# Patient Record
Sex: FEMALE | Race: BLACK OR AFRICAN AMERICAN | NOT HISPANIC OR LATINO | Employment: UNEMPLOYED | URBAN - METROPOLITAN AREA
[De-identification: names, ages, dates, MRNs, and addresses within clinical notes are randomized per-mention and may not be internally consistent; named-entity substitution may affect disease eponyms.]

---

## 2018-10-12 PROCEDURE — 88175 CYTOPATH C/V AUTO FLUID REDO: CPT | Performed by: OBSTETRICS & GYNECOLOGY

## 2019-09-13 PROCEDURE — 87086 URINE CULTURE/COLONY COUNT: CPT | Performed by: INTERNAL MEDICINE

## 2019-09-26 ENCOUNTER — HISTORICAL (OUTPATIENT)
Dept: RADIOLOGY | Facility: HOSPITAL | Age: 49
End: 2019-09-26

## 2019-10-03 ENCOUNTER — HISTORICAL (OUTPATIENT)
Dept: RADIOLOGY | Facility: HOSPITAL | Age: 49
End: 2019-10-03

## 2020-02-26 ENCOUNTER — TELEPHONE (OUTPATIENT)
Dept: ORTHOPEDICS | Facility: CLINIC | Age: 50
End: 2020-02-26

## 2020-02-26 NOTE — TELEPHONE ENCOUNTER
----- Message from Kalyani Bailey sent at 2/26/2020  2:19 PM CST -----  Contact: pt @ [phone]  Calling to speak with someone regarding her appt today at 4pm, asking if she will still be seen. Please call.

## 2020-02-26 NOTE — TELEPHONE ENCOUNTER
----- Message from Haven Eldridge sent at 2/26/2020  2:27 PM CST -----  Contact: patient  Please call above patient at [phone] rescheduled appointment because transportation was late please set her up with another service do not use Another road trip service not good per patient waiting on a call from the nurse thanks.

## 2020-03-05 ENCOUNTER — OFFICE VISIT (OUTPATIENT)
Dept: ORTHOPEDICS | Facility: CLINIC | Age: 50
End: 2020-03-05
Payer: MEDICAID

## 2020-03-05 VITALS
BODY MASS INDEX: 40.45 KG/M2 | HEART RATE: 95 BPM | HEIGHT: 62 IN | SYSTOLIC BLOOD PRESSURE: 102 MMHG | OXYGEN SATURATION: 99 % | WEIGHT: 219.81 LBS | RESPIRATION RATE: 18 BRPM | DIASTOLIC BLOOD PRESSURE: 70 MMHG

## 2020-03-05 DIAGNOSIS — M54.16 LUMBAR RADICULITIS: Primary | ICD-10-CM

## 2020-03-05 PROCEDURE — 99999 PR PBB SHADOW E&M-EST. PATIENT-LVL III: CPT | Mod: PBBFAC,,, | Performed by: ORTHOPAEDIC SURGERY

## 2020-03-05 PROCEDURE — 99203 OFFICE O/P NEW LOW 30 MIN: CPT | Mod: S$PBB,,, | Performed by: ORTHOPAEDIC SURGERY

## 2020-03-05 PROCEDURE — 99203 PR OFFICE/OUTPT VISIT, NEW, LEVL III, 30-44 MIN: ICD-10-PCS | Mod: S$PBB,,, | Performed by: ORTHOPAEDIC SURGERY

## 2020-03-05 PROCEDURE — 99999 PR PBB SHADOW E&M-EST. PATIENT-LVL III: ICD-10-PCS | Mod: PBBFAC,,, | Performed by: ORTHOPAEDIC SURGERY

## 2020-03-05 PROCEDURE — 99213 OFFICE O/P EST LOW 20 MIN: CPT | Mod: PBBFAC,PN | Performed by: ORTHOPAEDIC SURGERY

## 2020-03-05 RX ORDER — HYDROXYZINE HYDROCHLORIDE 25 MG/1
25 TABLET, FILM COATED ORAL NIGHTLY
COMMUNITY

## 2020-03-05 RX ORDER — VENLAFAXINE HYDROCHLORIDE 150 MG/1
150 CAPSULE, EXTENDED RELEASE ORAL DAILY
COMMUNITY

## 2020-03-05 RX ORDER — ERGOCALCIFEROL 1.25 MG/1
50000 CAPSULE ORAL
COMMUNITY

## 2020-03-05 RX ORDER — IBUPROFEN 800 MG/1
800 TABLET ORAL 2 TIMES DAILY
COMMUNITY
End: 2020-03-06 | Stop reason: ALTCHOICE

## 2020-03-05 RX ORDER — AMLODIPINE BESYLATE 2.5 MG/1
2.5 TABLET ORAL DAILY
COMMUNITY

## 2020-03-05 NOTE — PROGRESS NOTES
Subjective:    Patient ID:  Mercedes Ledezma is a 49 y.o. y.o. female who presents for initial visit for Numbness and Pain of the Right Lower Leg      49-year-old female who reports a 2 month history of right-sided lower back pain that radiates into her right leg down to her foot.  She denies a history of trauma.  She describes a sensation of numbness involving her entire right leg and, in fact, her entire right side and occasionally her whole body.  Symptoms are constant and worse at night.  She notes difficulty walking and states that she has been using a walker since the symptoms began.  She denies groin pain, bowel or bladder dysfunction and constitutional symptoms. She relates that she has arthritis of both knees with limited right knee flexion. She takes ibuprofen on a p.r.n. basis.  She has traveled from [place] as she has been unable to find an orthopedic provider in her home area that will accept her insurance.            Past Medical History:   Diagnosis Date    Bipolar 1 disorder     Cocaine abuse     Depression     HIV (human immunodeficiency virus infection)     Hyperlipemia     Hypertension     Hypertension     Syphilis     Vitamin D deficiency         Past Surgical History:   Procedure Laterality Date     SECTION      HYSTERECTOMY         Review of patient's allergies indicates:  No Known Allergies       Current Outpatient Medications:     amLODIPine (NORVASC) 2.5 MG tablet, Take 2.5 mg by mouth once daily., Disp: , Rfl:     atorvastatin (LIPITOR) 40 MG tablet, Take 40 mg by mouth once daily., Disp: , Rfl:     extctlshd-cpjeoicd-vctamgs ala (BIKTARVY) -25 mg per tablet, Take 1 tablet by mouth once daily., Disp: , Rfl:     ergocalciferol (VITAMIN D2) 50,000 unit Cap, Take 50,000 Units by mouth every 7 days., Disp: , Rfl:     hydrOXYzine HCl (ATARAX) 25 MG tablet, Take 25 mg by mouth nightly., Disp: , Rfl:     ibuprofen (ADVIL,MOTRIN) 800 MG tablet, Take 800 mg by mouth 2 (two)  times daily., Disp: , Rfl:     venlafaxine (EFFEXOR-XR) 150 MG Cp24, Take 150 mg by mouth once daily., Disp: , Rfl:     quetiapine (SEROQUEL) 300 MG Tab, Take 200 mg by mouth once daily. , Disp: , Rfl:     Social History     Socioeconomic History    Marital status: Single     Spouse name: Not on file    Number of children: Not on file    Years of education: Not on file    Highest education level: Not on file   Occupational History    Not on file   Social Needs    Financial resource strain: Not on file    Food insecurity:     Worry: Not on file     Inability: Not on file    Transportation needs:     Medical: Not on file     Non-medical: Not on file   Tobacco Use    Smoking status: Former Smoker   Substance and Sexual Activity    Alcohol use: No    Drug use: Yes     Types: Cocaine    Sexual activity: Not on file   Lifestyle    Physical activity:     Days per week: Not on file     Minutes per session: Not on file    Stress: Not on file   Relationships    Social connections:     Talks on phone: Not on file     Gets together: Not on file     Attends [employer] service: Not on file     Active member of club or organization: Not on file     Attends meetings of clubs or organizations: Not on file     Relationship status: Not on file   Other Topics Concern    Not on file   Social History Narrative    Not on file        No family history on file.     Review of Systems   Constitutional: Negative for chills and fever.   HENT: Negative for hearing loss.    Eyes: Negative for blurred vision.   Respiratory: Negative for shortness of breath.    Cardiovascular: Negative for chest pain.   Gastrointestinal: Negative for nausea and vomiting.   Genitourinary: Negative for frequency and urgency.   Musculoskeletal: Positive for back pain.   Neurological: Positive for sensory change.   Psychiatric/Behavioral: Positive for depression.        Objective:     /70 (BP Location: Left arm, Patient Position: Sitting, BP  "Method: Large (Automatic))   Pulse 95   Resp 18   Ht 5' 2" (1.575 m)   Wt 99.7 kg (219 lb 12.8 oz)   SpO2 99%   BMI 40.20 kg/m²     Ortho Exam     Obese 49-year-old female walks with a forward flexed posture in a stiff kneed gait on the right; can heel and toe rise    Bilateral lower extremity:  Sensation intact light touch; motor strength 5/5 except 4+ out of 5 right hip flexor secondary to exacerbation of low back pain; negative straight leg raise; negative Babinski's    Lumbosacral spine:  Diffuse right-sided tenderness to light touch; decreased range of motion all planes secondary to pain; positive Eder's sign    Right hip:  No focal tenderness; full range of motion; negative Stinchfield    Right knee:  No effusion erythema or warmth; patella stable; medial joint line tenderness; no gross ligamentous laxity or meniscal signs; range of motion:  0° to 110° flexion      Imaging:     X-rays two-view right knee dated 05/12/2016 are reviewed and show mild medial joint space narrowing.      Assessment & Plan:     Lumbar radiculitis  Suspect non-organic/psychological overlay in light of amplification and non-anatomic distribution of patient's symptomatology    1.  Findings, diagnosis and treatment options/risks/benefits were reviewed  2.  Voltaren 75 mg p.o. B.i.d. (discontinue ibuprofen)  3.  Continue walker support p.r.n.  4.  Emphasized importance of weight reduction  5.  Maintain judicious activity modification/limitation  6.  Refer to [facility] at [facility]  7.  Follow up on a p.r.n. basis    "

## 2020-03-05 NOTE — ASSESSMENT & PLAN NOTE
Suspect non-organic/psychological overlay in light of amplification and non-anatomic distribution of patient's symptomatology    1.  Findings, diagnosis and treatment options/risks/benefits were reviewed  2.  Voltaren 75 mg p.o. B.i.d. (discontinue ibuprofen)  3.  Continue walker support p.r.n.  4.  Emphasized importance of weight reduction  5.  Maintain judicious activity modification/limitation  6.  Refer to [facility] at [facility]  7.  Follow up on a p.r.n. basis

## 2020-03-06 ENCOUNTER — TELEPHONE (OUTPATIENT)
Dept: ORTHOPEDICS | Facility: CLINIC | Age: 50
End: 2020-03-06

## 2020-03-06 DIAGNOSIS — M54.16 LUMBAR RADICULITIS: Primary | ICD-10-CM

## 2020-03-06 RX ORDER — DICLOFENAC SODIUM 75 MG/1
75 TABLET, DELAYED RELEASE ORAL 2 TIMES DAILY
Qty: 60 TABLET | Refills: 1 | Status: CANCELLED | OUTPATIENT
Start: 2020-03-06

## 2020-03-06 RX ORDER — DICLOFENAC SODIUM 75 MG/1
75 TABLET, DELAYED RELEASE ORAL 2 TIMES DAILY
Qty: 60 TABLET | Refills: 1 | Status: SHIPPED | OUTPATIENT
Start: 2020-03-06

## 2020-03-06 NOTE — TELEPHONE ENCOUNTER
----- Message from Cynthia Doe sent at 3/6/2020 11:44 AM CST -----  Contact: Self  Pt ask for a call in regards to having physical therapy in [place] due to no facility near her home accept her insurance Pt also ask was there going to be another prescription send to the pharmacy for her pain and numbness.     Contact info  [phone]

## 2020-03-06 NOTE — TELEPHONE ENCOUNTER
----- Message from Shaan Holguin sent at 3/6/2020 10:00 AM CST -----  Contact: Pt  Patient called to speak to someone regarding her pain medication that was supposed to be sent to her pharmacy in relation to her sciatic nerve pain requesting callback to discuss the matter     [phone] (home)

## 2020-03-06 NOTE — TELEPHONE ENCOUNTER
Left message for patient stating that only one RX was sent to pharmacy and that order for PT was pending that to call back on Monday and we can check on it.

## 2020-03-06 NOTE — TELEPHONE ENCOUNTER
----- Message from Lisa Braxton sent at 3/6/2020  9:45 AM CST -----  Contact: self  Pt states she was waiting on her prescription to be sent to [facility] since yesterday. She did not know the name of the script but pt states she is in pain and needs the medication sent over ASAP.       Contact Info [phone] (home)

## 2020-03-06 NOTE — TELEPHONE ENCOUNTER
Spoke with patient and advised her that Dr. Garcia would send her Rx sorry for the inconvenience

## 2020-03-06 NOTE — TELEPHONE ENCOUNTER
----- Message from Verona Chisholm sent at 3/6/2020  8:07 AM CST -----  Contact: pt   Pt called stated she was seen yesterday on 3/5/20 and a Rx was to be called into [facility] but [facility] never rec'd it.  She also stated she would like to have her therapy at [facility]. Call back [phone]      [id number]  [place] - [address] [facility] [address]  [address] [place] [address]  Phone: [phone] Fax: [phone]

## 2020-03-16 ENCOUNTER — TELEPHONE (OUTPATIENT)
Dept: ORTHOPEDICS | Facility: CLINIC | Age: 50
End: 2020-03-16

## 2020-03-16 NOTE — TELEPHONE ENCOUNTER
----- Message from Rachel Hardy sent at 3/16/2020  9:50 AM CDT -----  Contact: Pt  Reason: Pt is calling to speak with the Dr or Nurse regard to still in pain. Pt stated the medicine is not working.    Communication: [phone]

## 2020-03-16 NOTE — TELEPHONE ENCOUNTER
Patient stated she is still experiencing severe pain is requesting a stronger medication, patient was advised to follow up with her PCP or visit ED if pain persists

## 2020-03-17 ENCOUNTER — TELEPHONE (OUTPATIENT)
Dept: ORTHOPEDICS | Facility: CLINIC | Age: 50
End: 2020-03-17

## 2020-03-17 NOTE — TELEPHONE ENCOUNTER
----- Message from Josephine Malcolm sent at 3/17/2020  9:25 AM CDT -----  Pt is schedule for PT and would like to know where to go she need an address to the place she is going to.  referred her over and the pt would like for the nurse to give her a call back at [phone]

## 2020-03-25 ENCOUNTER — TELEPHONE (OUTPATIENT)
Dept: ORTHOPEDICS | Facility: CLINIC | Age: 50
End: 2020-03-25

## 2020-03-25 NOTE — TELEPHONE ENCOUNTER
----- Message from Sunil Brown sent at 3/25/2020  2:48 PM CDT -----  Contact: pt: [phone]  Pt state the medication she was given does not work, right side is still numb       Please contact pt: [phone]

## 2020-03-25 NOTE — TELEPHONE ENCOUNTER
Patient states she is having pain mostly at night, her right hip is worse and her feet are numb, she is requesting an appointment to come in and be seen  Patient states the diclofenac 75mg isn't working for her

## 2020-03-27 ENCOUNTER — HISTORICAL (OUTPATIENT)
Dept: RADIOLOGY | Facility: HOSPITAL | Age: 50
End: 2020-03-27

## 2020-05-04 ENCOUNTER — HISTORICAL (OUTPATIENT)
Dept: RADIOLOGY | Facility: HOSPITAL | Age: 50
End: 2020-05-04

## 2020-05-18 ENCOUNTER — HISTORICAL (OUTPATIENT)
Dept: RADIOLOGY | Facility: HOSPITAL | Age: 50
End: 2020-05-18

## 2020-09-29 ENCOUNTER — HISTORICAL (OUTPATIENT)
Dept: ADMINISTRATIVE | Facility: HOSPITAL | Age: 50
End: 2020-09-29

## 2020-11-19 ENCOUNTER — HISTORICAL (OUTPATIENT)
Dept: RADIOLOGY | Facility: HOSPITAL | Age: 50
End: 2020-11-19

## 2022-01-14 PROCEDURE — 88377 M/PHMTRC ALYS ISHQUANT/SEMIQ: CPT | Performed by: RADIOLOGY

## 2022-01-25 ENCOUNTER — TELEPHONE (OUTPATIENT)
Dept: GENETICS | Facility: HOSPITAL | Age: 52
End: 2022-01-25

## 2022-01-25 NOTE — TELEPHONE ENCOUNTER
Good Morning Michelle, I have Doug Saab telling me that you stated to make an appointment for her Wednesday 1/26/22 after 1100 AM. I want to check with you before booking this appointment, Thanks, Isabell Fonseca

## 2022-01-26 ENCOUNTER — GENETICS ENCOUNTER (OUTPATIENT)
Dept: GENETICS | Facility: HOSPITAL | Age: 52
End: 2022-01-26
Attending: GENETIC COUNSELOR, MS
Payer: COMMERCIAL

## 2022-01-26 ENCOUNTER — NURSE ONLY (OUTPATIENT)
Dept: HEMATOLOGY/ONCOLOGY | Facility: HOSPITAL | Age: 52
End: 2022-01-26
Attending: GENETIC COUNSELOR, MS
Payer: COMMERCIAL

## 2022-01-26 DIAGNOSIS — Z80.3 FAMILY HISTORY OF MALIGNANT NEOPLASM OF BREAST: ICD-10-CM

## 2022-01-26 DIAGNOSIS — C50.912 MALIGNANT NEOPLASM OF LEFT BREAST IN FEMALE, ESTROGEN RECEPTOR POSITIVE, UNSPECIFIED SITE OF BREAST (HCC): Primary | ICD-10-CM

## 2022-01-26 DIAGNOSIS — Z17.0 MALIGNANT NEOPLASM OF LEFT BREAST IN FEMALE, ESTROGEN RECEPTOR POSITIVE, UNSPECIFIED SITE OF BREAST (HCC): Primary | ICD-10-CM

## 2022-01-26 PROCEDURE — 96040 HC GENETIC COUNSELING EA 30 MIN: CPT | Performed by: GENETIC COUNSELOR, MS

## 2022-01-26 PROCEDURE — 36415 COLL VENOUS BLD VENIPUNCTURE: CPT

## 2022-01-26 NOTE — PROGRESS NOTES
Reason for visit: Mrs. Tonio Guan is a 61-year-old woman referred for genetic counseling due to her recent diagnosis of breast cancer. She was seen today to discuss the option of genetic testing and how this may help with her management and surgical options. with a bladder cancer (believed due to her radiation treatments) at age 78. She is of [place] heritage on both sides of her family and she is unaware of any consanguinity or Ashkenazi Yarsanism heritage.   She does not believe that any family members have pu breast cancer. We discussed the benefits and limitations of BRCA1/2 testing versus multi-gene panels that include BRCA1/2 as well as other genes associated with different cancers, such as uterine cancer.   Panels are an appropriate option for individuals to contact my office if you have any questions or concerns, [phone]. Plan:  1. Blood was drawn and sent out for [facility] Breast Cancer STAT Panel plus GUERRERO and CHEK2 testing through [facility]. 2. The Genetics office will call Mrs. Larkin when her result

## 2022-02-07 ENCOUNTER — TELEPHONE (OUTPATIENT)
Dept: GENETICS | Facility: HOSPITAL | Age: 52
End: 2022-02-07

## 2022-02-07 NOTE — TELEPHONE ENCOUNTER
Reported NEGATIVE genetic testing results to Marisa. She opted for 9 gene breast-focused panel through [facility] ([facility] Breast Cancer STAT Panel with GUERRERO and CHEK2). She was reassured to hear these results and all her questions were answered to the best of my ability. Released results through lab's portal and will mail her a copy as well. She was appreciative of the call.

## 2022-04-11 ENCOUNTER — HISTORICAL (OUTPATIENT)
Dept: ADMINISTRATIVE | Facility: HOSPITAL | Age: 52
End: 2022-04-11
Payer: MEDICAID

## 2022-04-27 VITALS
DIASTOLIC BLOOD PRESSURE: 83 MMHG | SYSTOLIC BLOOD PRESSURE: 124 MMHG | WEIGHT: 228.19 LBS | HEIGHT: 63 IN | BODY MASS INDEX: 40.43 KG/M2

## 2022-06-06 ENCOUNTER — HOSPITAL ENCOUNTER (EMERGENCY)
Facility: HOSPITAL | Age: 52
Discharge: HOME | End: 2022-06-06
Attending: EMERGENCY MEDICINE
Payer: MEDICAID

## 2022-06-06 VITALS
TEMPERATURE: 98 F | SYSTOLIC BLOOD PRESSURE: 103 MMHG | RESPIRATION RATE: 17 BRPM | BODY MASS INDEX: 40.48 KG/M2 | OXYGEN SATURATION: 97 % | DIASTOLIC BLOOD PRESSURE: 79 MMHG | HEART RATE: 92 BPM | HEIGHT: 62 IN | WEIGHT: 220 LBS

## 2022-06-06 DIAGNOSIS — M54.6 ACUTE BILATERAL THORACIC BACK PAIN: ICD-10-CM

## 2022-06-06 DIAGNOSIS — S09.90XA CLOSED HEAD INJURY, INITIAL ENCOUNTER: ICD-10-CM
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
## 2022-06-06 LAB
ALBUMIN SERPL-MCNC: 3.9 GM/DL (ref 3.5–5)
ALBUMIN/GLOB SERPL: 1 RATIO (ref 1.1–2)
ALP SERPL-CCNC: 161 UNIT/L (ref 40–150)
ALT SERPL-CCNC: 22 UNIT/L (ref 0–55)
APTT PPP: 26.4 SECONDS (ref 23.2–33.7)
AST SERPL-CCNC: 23 UNIT/L (ref 5–34)
BASOPHILS # BLD AUTO: 0.06 X10(3)/MCL (ref 0–0.2)
BASOPHILS NFR BLD AUTO: 0.6 %
BILIRUBIN DIRECT+TOT PNL SERPL-MCNC: 0.3 MG/DL
BUN SERPL-MCNC: 9.5 MG/DL (ref 8.4–25.7)
CALCIUM SERPL-MCNC: 10 MG/DL (ref 8.4–10.2)
CHLORIDE SERPL-SCNC: 105 MMOL/L (ref 98–107)
CO2 SERPL-SCNC: 24 MMOL/L (ref 22–29)
CREAT SERPL-MCNC: 0.81 MG/DL (ref 0.73–1.18)
EOSINOPHIL # BLD AUTO: 0.1 X10(3)/MCL (ref 0–0.9)
EOSINOPHIL NFR BLD AUTO: 1 %
ERYTHROCYTE [DISTWIDTH] IN BLOOD BY AUTOMATED COUNT: 14.2 % (ref 11.5–17)
ETHANOL SERPL-MCNC: <10 MG/DL
GLOBULIN SER-MCNC: 3.8 GM/DL (ref 2.4–3.5)
GLUCOSE SERPL-MCNC: 90 MG/DL (ref 74–100)
HCT VFR BLD AUTO: 42.1 % (ref 42–52)
HGB BLD-MCNC: 13.7 GM/DL (ref 14–18)
IMM GRANULOCYTES # BLD AUTO: 0.11 X10(3)/MCL (ref 0–0.02)
IMM GRANULOCYTES NFR BLD AUTO: 1.1 % (ref 0–0.43)
INR BLD: 1 (ref 0–1.3)
LACTATE SERPL-SCNC: 1.6 MMOL/L (ref 0.5–2.2)
LYMPHOCYTES # BLD AUTO: 3.61 X10(3)/MCL (ref 0.6–4.6)
LYMPHOCYTES NFR BLD AUTO: 34.6 %
MCH RBC QN AUTO: 31 PG (ref 27–31)
MCHC RBC AUTO-ENTMCNC: 32.5 MG/DL (ref 33–36)
MCV RBC AUTO: 95.2 FL (ref 80–94)
MONOCYTES # BLD AUTO: 0.6 X10(3)/MCL (ref 0.1–1.3)
MONOCYTES NFR BLD AUTO: 5.8 %
NEUTROPHILS # BLD AUTO: 5.9 X10(3)/MCL (ref 2.1–9.2)
NEUTROPHILS NFR BLD AUTO: 56.9 %
NRBC BLD AUTO-RTO: 0 %
PLATELET # BLD AUTO: 252 X10(3)/MCL (ref 130–400)
PMV BLD AUTO: 10.5 FL (ref 9.4–12.4)
POTASSIUM SERPL-SCNC: 4.9 MMOL/L (ref 3.5–5.1)
PROT SERPL-MCNC: 7.7 GM/DL (ref 6.4–8.3)
PROTHROMBIN TIME: 13.1 SECONDS (ref 12.5–14.5)
RBC # BLD AUTO: 4.42 X10(6)/MCL (ref 4.7–6.1)
SODIUM SERPL-SCNC: 138 MMOL/L (ref 136–145)
WBC # SPEC AUTO: 10.4 X10(3)/MCL (ref 4.5–11.5)

## 2022-06-06 PROCEDURE — 96375 TX/PRO/DX INJ NEW DRUG ADDON: CPT

## 2022-06-06 PROCEDURE — 85610 PROTHROMBIN TIME: CPT | Performed by: SURGERY

## 2022-06-06 PROCEDURE — 85025 COMPLETE CBC W/AUTO DIFF WBC: CPT | Performed by: SURGERY

## 2022-06-06 PROCEDURE — 96374 THER/PROPH/DIAG INJ IV PUSH: CPT

## 2022-06-06 PROCEDURE — 85730 THROMBOPLASTIN TIME PARTIAL: CPT | Performed by: SURGERY

## 2022-06-06 PROCEDURE — 83605 ASSAY OF LACTIC ACID: CPT | Performed by: SURGERY

## 2022-06-06 PROCEDURE — G0390 TRAUMA RESPONS W/HOSP CRITI: HCPCS | Performed by: EMERGENCY MEDICINE

## 2022-06-06 PROCEDURE — 86850 RBC ANTIBODY SCREEN: CPT | Performed by: SURGERY

## 2022-06-06 PROCEDURE — 82077 ASSAY SPEC XCP UR&BREATH IA: CPT | Performed by: SURGERY

## 2022-06-06 PROCEDURE — 99285 EMERGENCY DEPT VISIT HI MDM: CPT | Mod: 25

## 2022-06-06 PROCEDURE — 36415 COLL VENOUS BLD VENIPUNCTURE: CPT | Performed by: SURGERY

## 2022-06-06 PROCEDURE — 63600175 PHARM REV CODE 636 W HCPCS: Performed by: EMERGENCY MEDICINE

## 2022-06-06 PROCEDURE — 80053 COMPREHEN METABOLIC PANEL: CPT | Performed by: SURGERY

## 2022-06-06 RX ORDER — METHOCARBAMOL 750 MG/1
1500 TABLET, FILM COATED ORAL 3 TIMES DAILY
Qty: 30 TABLET | Refills: 0 | Status: SHIPPED | OUTPATIENT
Start: 2022-06-06 | End: 2022-06-11

## 2022-06-06 RX ORDER — KETOROLAC TROMETHAMINE 30 MG/ML
15 INJECTION, SOLUTION INTRAMUSCULAR; INTRAVENOUS
Status: COMPLETED | OUTPATIENT
Start: 2022-06-06 | End: 2022-06-06

## 2022-06-06 RX ORDER — NAPROXEN 500 MG/1
500 TABLET ORAL 2 TIMES DAILY WITH MEALS
Qty: 30 TABLET | Refills: 0 | Status: SHIPPED | OUTPATIENT
Start: 2022-06-06

## 2022-06-06 RX ORDER — LORAZEPAM 2 MG/ML
INJECTION INTRAMUSCULAR CODE/TRAUMA/SEDATION MEDICATION
Status: COMPLETED | OUTPATIENT
Start: 2022-06-06 | End: 2022-06-06

## 2022-06-06 RX ORDER — LORAZEPAM 2 MG/ML
INJECTION INTRAMUSCULAR
Status: DISCONTINUED
Start: 2022-06-06 | End: 2022-06-06 | Stop reason: HOSPADM

## 2022-06-06 RX ADMIN — KETOROLAC TROMETHAMINE 15 MG: 30 INJECTION, SOLUTION INTRAMUSCULAR at 03:06

## 2022-06-06 RX ADMIN — LORAZEPAM 2 MG: 2 INJECTION INTRAMUSCULAR; INTRAVENOUS at 12:06

## 2022-06-06 NOTE — CONSULTS
[facility] - Emergency Dept  General Surgery  Consult Note    Patient Name: Mercedes Ledezma  MRN: [id number]  Code Status: No Order  Admission Date: 6/6/2022  Hospital Length of Stay: 0 days  Attending Physician: No att. providers found  Primary Care Provider: Primary Doctor No    Patient information was obtained from ER records.     Consults  Subjective:     Principal Problem: <principal problem not specified>    History of Present Illness: 51-year-old female with a past medical history of HIV and bipolar disease who lives in a group home presents after slipping and falling.  This was an unwitnessed fall.  Reports she was walking in the cafeteria where she fell onto her back on a hard floor.  She was activated as a level 1 trauma activation due to inability to move her bilateral lower extremities.  She was hemodynamically stable.  She was GCS 15. She did report loss of consciousness.  She did undergo a CT scan of her head and her complete spine.  All negative.  She on arrival to the emergency department was noted to have withdrawal to pain to bilateral lower extremities.  She had 2+ pulses bilaterally.  She began having spontaneous movement to command to bilateral lower extremities while the CT scanner and she was ultimately able to ambulate prior to discharge.  She will follow up her primary care provider as previously planned with the emergency department.  No indication of follow-up with us.        No current facility-administered medications on file prior to encounter.     No current outpatient medications on file prior to encounter.       Review of patient's allergies indicates:  Not on File    No past medical history on file.  No past surgical history on file.  Family History    None       Tobacco Use    Smoking status: Not on file    Smokeless tobacco: Not on file   Substance and Sexual Activity    Alcohol use: Not on file    Drug use: Not on file    Sexual activity: Not on file     Review  of Systems   Constitutional:  Negative for chills and fever.   HENT:  Negative for ear pain and trouble swallowing.    Eyes:  Negative for pain and redness.   Respiratory:  Negative for cough and chest tightness.    Cardiovascular:  Negative for chest pain, palpitations and leg swelling.   Gastrointestinal:  Negative for abdominal distention, abdominal pain, nausea and vomiting.   Genitourinary:  Negative for difficulty urinating.   Musculoskeletal:  Negative for back pain and neck pain.   Skin:  Negative for color change, pallor and wound.   Neurological:  Positive for weakness and numbness. Negative for dizziness, syncope, speech difficulty, light-headedness and headaches.   Psychiatric/Behavioral:  Negative for agitation and suicidal ideas.    All other systems reviewed and are negative.  Objective:     Vital Signs (Most Recent):  Temp: 98.4 °F (36.9 °C) (06/06/22 1345)  Pulse: 92 (06/06/22 1356)  Resp: 17 (06/06/22 1356)  BP: 103/79 (06/06/22 1356)  SpO2: 97 % (06/06/22 1356) Vital Signs (24h Range):  Temp:  [98.4 °F (36.9 °C)] 98.4 °F (36.9 °C)  Pulse:  [81-92] 92  Resp:  [14-21] 17  SpO2:  [97 %-100 %] 97 %  BP: (103-142)/(65-79) 103/79     Weight: 99.8 kg (220 lb)  Body mass index is 40.24 kg/m².    Physical Exam  Constitutional:       Appearance: Normal appearance.   HENT:      Head: Normocephalic and atraumatic.      Nose: Nose normal.   Eyes:      Pupils: Pupils are equal, round, and reactive to light.   Cardiovascular:      Rate and Rhythm: Normal rate.      Pulses: Normal pulses.      Comments: Normal peripheral pulses  Pulmonary:      Effort: Pulmonary effort is normal. No respiratory distress.   Chest:      Chest wall: No tenderness.   Abdominal:      General: Abdomen is flat. Bowel sounds are normal. There is no distension.      Palpations: Abdomen is soft.      Tenderness: There is no abdominal tenderness.   Musculoskeletal:         General: No swelling, tenderness, deformity or signs of injury.       Cervical back: Normal range of motion and neck supple. No tenderness.   Skin:     General: Skin is warm and dry.      Capillary Refill: Capillary refill takes less than 2 seconds.      Findings: No lesion.   Neurological:      Mental Status: She is alert and oriented to person, place, and time. Mental status is at baseline.      Motor: Weakness present.   Psychiatric:         Mood and Affect: Mood normal.         Behavior: Behavior normal.         Thought Content: Thought content normal.         Judgment: Judgment normal.       Significant Labs:  I have reviewed all pertinent lab results within the past 24 hours.    Significant Diagnostics:  I have reviewed all pertinent imaging results/findings within the past 24 hours.      Assessment/Plan:     Fall from standing  No injuries found.  Symptoms have resolved.  Safe for discharge back to nursing home or group home.       VTE Risk Mitigation (From admission, onward)    None          Thank you for your consult. I will sign off. Please contact us if you have any additional questions.    Abdi Chau, BELINDA  General Surgery  [facility] - Emergency Dept

## 2022-06-06 NOTE — SUBJECTIVE & OBJECTIVE
No current facility-administered medications on file prior to encounter.     No current outpatient medications on file prior to encounter.       Review of patient's allergies indicates:  Not on File    No past medical history on file.  No past surgical history on file.  Family History    None       Tobacco Use    Smoking status: Not on file    Smokeless tobacco: Not on file   Substance and Sexual Activity    Alcohol use: Not on file    Drug use: Not on file    Sexual activity: Not on file     Review of Systems   Constitutional:  Negative for chills and fever.   HENT:  Negative for ear pain and trouble swallowing.    Eyes:  Negative for pain and redness.   Respiratory:  Negative for cough and chest tightness.    Cardiovascular:  Negative for chest pain, palpitations and leg swelling.   Gastrointestinal:  Negative for abdominal distention, abdominal pain, nausea and vomiting.   Genitourinary:  Negative for difficulty urinating.   Musculoskeletal:  Negative for back pain and neck pain.   Skin:  Negative for color change, pallor and wound.   Neurological:  Positive for weakness and numbness. Negative for dizziness, syncope, speech difficulty, light-headedness and headaches.   Psychiatric/Behavioral:  Negative for agitation and suicidal ideas.    All other systems reviewed and are negative.  Objective:     Vital Signs (Most Recent):  Temp: 98.4 °F (36.9 °C) (06/06/22 1345)  Pulse: 92 (06/06/22 1356)  Resp: 17 (06/06/22 1356)  BP: 103/79 (06/06/22 1356)  SpO2: 97 % (06/06/22 1356) Vital Signs (24h Range):  Temp:  [98.4 °F (36.9 °C)] 98.4 °F (36.9 °C)  Pulse:  [81-92] 92  Resp:  [14-21] 17  SpO2:  [97 %-100 %] 97 %  BP: (103-142)/(65-79) 103/79     Weight: 99.8 kg (220 lb)  Body mass index is 40.24 kg/m².    Physical Exam  Constitutional:       Appearance: Normal appearance.   HENT:      Head: Normocephalic and atraumatic.      Nose: Nose normal.   Eyes:      Pupils: Pupils are equal, round, and reactive to light.    Cardiovascular:      Rate and Rhythm: Normal rate.      Pulses: Normal pulses.      Comments: Normal peripheral pulses  Pulmonary:      Effort: Pulmonary effort is normal. No respiratory distress.   Chest:      Chest wall: No tenderness.   Abdominal:      General: Abdomen is flat. Bowel sounds are normal. There is no distension.      Palpations: Abdomen is soft.      Tenderness: There is no abdominal tenderness.   Musculoskeletal:         General: No swelling, tenderness, deformity or signs of injury.      Cervical back: Normal range of motion and neck supple. No tenderness.   Skin:     General: Skin is warm and dry.      Capillary Refill: Capillary refill takes less than 2 seconds.      Findings: No lesion.   Neurological:      Mental Status: She is alert and oriented to person, place, and time. Mental status is at baseline.      Motor: Weakness present.   Psychiatric:         Mood and Affect: Mood normal.         Behavior: Behavior normal.         Thought Content: Thought content normal.         Judgment: Judgment normal.       Significant Labs:  I have reviewed all pertinent lab results within the past 24 hours.    Significant Diagnostics:  I have reviewed all pertinent imaging results/findings within the past 24 hours.

## 2022-06-06 NOTE — ASSESSMENT & PLAN NOTE
No injuries found.  Symptoms have resolved.  Safe for discharge back to nursing home or group home.

## 2022-06-06 NOTE — HPI
51-year-old female with a past medical history of HIV and bipolar disease who lives in a group home presents after slipping and falling.  This was an unwitnessed fall.  Reports she was walking in the cafeteria where she fell onto her back on a hard floor.  She was activated as a level 1 trauma activation due to inability to move her bilateral lower extremities.  She was hemodynamically stable.  She was GCS 15. She did report loss of consciousness.  She did undergo a CT scan of her head and her complete spine.  All negative.  She on arrival to the emergency department was noted to have withdrawal to pain to bilateral lower extremities.  She had 2+ pulses bilaterally.  She began having spontaneous movement to command to bilateral lower extremities while the CT scanner and she was ultimately able to ambulate prior to discharge.  She will follow up her primary care provider as previously planned with the emergency department.  No indication of follow-up with us.

## 2022-06-06 NOTE — ED PROVIDER NOTES
Encounter Date: 6/6/2022    SCRIBE #1 NOTE: I, Mikaela Michael, am scribing for, and in the presence of,  Alisson Jim MD. I have scribed the following portions of the note - Other sections scribed: HPI, ROS, and physical examination.       History   No chief complaint on file.    51 y.o. female with a history of bipolar disorder and HIV, presents to ED via EMS as a level 1 trauma after experiencing an unwitnessed fall just PTA. EMS report pt was walking in a group home cafeteria when she slipped on some water and fell backwards. Pt hit her head, but unknown if she hit it on the floor or counter. Per EMS, pt lost consciousness for about 2 minutes, and in route she has been repeating questions. They state pt has complaints of head pain and mid back pain. She also reports that she can not feel anything from the waist down, but she does move toes in response to pain.    The history is provided by the patient and the EMS personnel. No  was used.   Fall  The accident occurred today. The fall occurred while walking. Distance fallen: ground level. He landed on a hard floor. The point of impact was the head. The pain is present in the back and head. There was no entrapment after the fall. There was no drug use involved in the accident. There was no alcohol use involved in the accident. Associated symptoms include back pain and headaches (posterior head). Pertinent negatives include no fever, no numbness, no abdominal pain, no nausea, no vomiting and no hematuria. He has tried nothing for the symptoms.     Review of patient's allergies indicates:  Not on File  No past medical history on file.  No past surgical history on file.  No family history on file.     Review of Systems   Constitutional: Negative for chills, diaphoresis and fever.   HENT: Negative for congestion, ear pain, sinus pain and sore throat.    Eyes: Negative for pain, discharge and visual disturbance.   Respiratory: Negative for cough,  shortness of breath, wheezing and stridor.    Cardiovascular: Negative for chest pain and palpitations.   Gastrointestinal: Negative for abdominal pain, constipation, diarrhea, nausea, rectal pain and vomiting.   Genitourinary: Negative for dysuria and hematuria.   Musculoskeletal: Positive for back pain. Negative for myalgias.   Skin: Negative for rash.   Neurological: Positive for headaches (posterior head). Negative for dizziness, syncope and numbness.        Loss of consciousness   Hematological: Negative.    Psychiatric/Behavioral: Negative.    All other systems reviewed and are negative.      Physical Exam     Initial Vitals [06/06/22 1252]   BP Pulse Resp Temp SpO2   (!) 142/78 88 (!) 21 98.4 °F (36.9 °C) 100 %      MAP       --         Physical Exam    Nursing note and vitals reviewed.  Constitutional: She appears well-developed and well-nourished. She is not diaphoretic. No distress.   HENT:   Head: Normocephalic and atraumatic.   Nose: Nose normal.   Mouth/Throat: Oropharynx is clear and moist.   Eyes: Conjunctivae and EOM are normal. Pupils are equal, round, and reactive to light.   Pupils 3 mm bilaterally   Neck: Trachea normal. Neck supple.   C-collar placed in ED   Cardiovascular: Normal rate, regular rhythm, normal heart sounds and intact distal pulses.   No murmur heard.  Pulmonary/Chest: Breath sounds normal. No respiratory distress. She has no wheezes. She has no rhonchi. She has no rales. She exhibits no tenderness.   Breath sounds bilaterally   Abdominal: Abdomen is soft. Bowel sounds are normal. She exhibits no distension and no mass. There is no abdominal tenderness. There is no rebound and no guarding.   Genitourinary:    Rectum normal.      Genitourinary Comments: Rectal: good tone, no blood     Musculoskeletal:         General: No tenderness or edema. Normal range of motion.      Cervical back: Neck supple.      Lumbar back: Normal. Normal range of motion.      Comments: No external trauma  noted. Tenderness to mid thoracic region and entire lumbar region.     Neurological: She is alert and oriented to person, place, and time. She has normal strength. A sensory deficit is present. No cranial nerve deficit. GCS eye subscore is 4. GCS verbal subscore is 5. GCS motor subscore is 6.   Sensation and movement to pain noted in bilateral LE   Skin: Skin is warm and dry. Capillary refill takes less than 2 seconds. No abscess noted. No erythema. No pallor.   Psychiatric: She has a normal mood and affect. Her behavior is normal. Judgment and thought content normal.         ED Course   Procedures  Labs Reviewed   COMPREHENSIVE METABOLIC PANEL - Abnormal; Notable for the following components:       Result Value    Globulin 3.8 (*)     Albumin/Globulin Ratio 1.0 (*)     Alkaline Phosphatase 161 (*)     All other components within normal limits   CBC WITH DIFFERENTIAL - Abnormal; Notable for the following components:    RBC 4.42 (*)     Hgb 13.7 (*)     MCV 95.2 (*)     MCHC 32.5 (*)     IG# 0.11 (*)     IG% 1.1 (*)     All other components within normal limits   PROTIME-INR - Normal   APTT - Normal   LACTIC ACID, PLASMA - Normal   ALCOHOL,MEDICAL (ETHANOL) - Normal   CBC W/ AUTO DIFFERENTIAL    Narrative:     The following orders were created for panel order CBC auto differential.  Procedure                               Abnormality         Status                     ---------                               -----------         ------                     CBC with Differential[[id number]]        Abnormal            Final result                 Please view results for these tests on the individual orders.   URINALYSIS, REFLEX TO URINE CULTURE   DRUG SCREEN, URINE (BEAKER)   TYPE & SCREEN          Imaging Results          CT Lumbar Spine Without Contrast (Final result)  Result time 06/06/22 14:33:12    Final result by Love Lundy MD (06/06/22 14:33:12)                 Impression:      No acute fracture  identified.      Electronically signed by: Love Lundy  Date:    06/06/2022  Time:    14:33             Narrative:    EXAMINATION:  CT LUMBAR SPINE WITHOUT CONTRAST    CLINICAL HISTORY:  Low back pain, trauma;    TECHNIQUE:  Noncontrast CT images of the lumbar spine. Axial, coronal, and sagittal reformatted images were obtained. Dose length product is 1002 mGycm. Automatic exposure control, adjustment of mA/kV or iterative reconstruction technique was used to limit radiation dose.    COMPARISON:  None    FINDINGS:  Evaluation is limited by motion artifact.  There are 5 non-rib-bearing lumbar type vertebral bodies.  Alignment is normal without subluxation.  There is no acute fracture identified.  There is multilevel marginal osteophyte formation and facet arthropathy.  There is no paraspinal hematoma.                               CT Thoracic Spine Without Contrast (Final result)  Result time 06/06/22 14:27:14    Final result by Love Lundy MD (06/06/22 14:27:14)                 Impression:      No acute fracture identified.      Electronically signed by: Love Lundy  Date:    06/06/2022  Time:    14:27             Narrative:    EXAMINATION:  CT THORACIC SPINE WITHOUT CONTRAST    CLINICAL HISTORY:  Mid-back pain, neuro deficit;    TECHNIQUE:  Noncontrast CT images of the thoracic spine. Axial, coronal, and sagittal reformatted images were obtained. Dose length product is 1188 mGycm. Automatic exposure control, adjustment of mA/kV or iterative reconstruction technique was used to limit radiation dose.    COMPARISON:  No relevant comparison studies available at the time of dictation.    FINDINGS:  Thoracic alignment is preserved.  The vertebral body heights and disc spaces are maintained.  There is no acute fracture identified.  There is multilevel marginal osteophyte formation and facet arthropathy.  There is no paraspinal hematoma.                               CT Cervical Spine Without Contrast  (Final result)  Result time 06/06/22 14:25:00    Final result by Love Lundy MD (06/06/22 14:25:00)                 Impression:      No acute fracture identified.      Electronically signed by: Love Lundy  Date:    06/06/2022  Time:    14:25             Narrative:    EXAMINATION:  CT CERVICAL SPINE WITHOUT CONTRAST    CLINICAL HISTORY:  Trauma;    TECHNIQUE:  Noncontrast CT images of the cervical spine. Axial, coronal, and sagittal reformatted images were obtained. Dose length product is 657 mGycm. Automatic exposure control, adjustment of mA/kV or iterative reconstruction technique was used to limit radiation dose.    COMPARISON:  None    FINDINGS:  The cervical spine is visualized through the level of T1.    There is no acute fracture identified.  There is straightening of normal cervical lordosis.  There is multilevel marginal osteophyte formation and facet arthropathy.  There is no paraspinal hematoma.                               CT Head Without Contrast (Final result)  Result time 06/06/22 14:17:59    Final result by Love Lundy MD (06/06/22 14:17:59)                 Impression:      No acute intracranial abnormality.      Electronically signed by: Love Lundy  Date:    06/06/2022  Time:    14:17             Narrative:    EXAMINATION:  CT HEAD WITHOUT CONTRAST    CLINICAL HISTORY:  Trauma;    TECHNIQUE:  Axial scans were obtained from skull base to the vertex.    Coronal and sagittal reconstructions obtained from the axial data.    Automatic exposure control was utilized to limit radiation dose.    Contrast: None    Radiation Dose:    Total DLP: 1195 mGy*cm    COMPARISON:  None    FINDINGS:  There is no acute intracranial hemorrhage or edema. The gray-white matter differentiation is preserved.    There is no mass effect or midline shift. The ventricles and sulci are normal in size. The basal cisterns are patent. There is no abnormal extra-axial fluid collection.    The calvarium and  skull base are intact. The visualized paranasal sinuses and the mastoid air cells are clear.                               X-Ray Pelvis Routine AP (Final result)  Result time 06/06/22 13:28:39    Final result by Love Lundy MD (06/06/22 13:28:39)                 Impression:      No definite displaced fracture identified.      Electronically signed by: Love Lundy  Date:    06/06/2022  Time:    13:28             Narrative:    EXAMINATION:  XR PELVIS ROUTINE AP    CLINICAL HISTORY:  r/o bleeding or hemorrhage;    COMPARISON:  None.    FINDINGS:  Evaluation limited by overlying soft tissues.  There is no definite displaced fracture identified.                               X-Ray Chest 1 View (Final result)  Result time 06/06/22 13:27:57    Final result by Love Lundy MD (06/06/22 13:27:57)                 Impression:      No acute abnormality of the chest.      Electronically signed by: Love Lundy  Date:    06/06/2022  Time:    13:27             Narrative:    EXAMINATION:  XR CHEST 1 VIEW    CLINICAL HISTORY:  r/o bleeding or hemorrhage;    TECHNIQUE:  AP chest    COMPARISON:  None.    FINDINGS:  The heart is prominent size.  There is no focal airspace consolidation.  There is no pleural effusion or visible pneumothorax.  There is no definite displaced fracture identified.                                 Medications   lorazepam (ATIVAN) 2 mg/mL injection (has no administration in time range)   lorazepam injection (2 mg Intravenous Given 6/6/22 1254)   ketorolac injection 15 mg (15 mg Intravenous Given 6/6/22 1500)     Medical Decision Making:   History:   I obtained history from: EMS provider.  Initial Assessment:   Activated due to reported numbness but patient has intact sensation, pain controlled, ambulatory  Differential Diagnosis:   Spinal farcture, ich  Independently Interpreted Test(s):   I have ordered and independently interpreted X-rays - see prior notes.  Clinical Tests:   Lab  Tests: Ordered and Reviewed  Radiological Study: Ordered and Reviewed  ED Management:  Ativan, imaging, labs, pain control  Other:   I have discussed this case with another health care provider.          Scribe Attestation:   Scribe #1: I performed the above scribed service and the documentation accurately describes the services I performed. I attest to the accuracy of the note.  Comments: Attending:   Physician Attestation Statement for Scribe #1: IAlisson MD, personally performed the services described in this documentation. All medical record entries made by the scribe were at my direction and in my presence.  I have reviewed the chart and agree that the record reflects my personal performance and is accurate and complete.      Attending Attestation:           Physician Attestation for Scribe:  Physician Attestation Statement for Scribe #1: I, Alisson Jim MD, reviewed documentation, as scribed by Mikaela Michael in my presence, and it is both accurate and complete.             ED Course as of 06/06/22 1513   Mon Jun 06, 2022   1446 Collar cleared, per trauma if patient can ambulate she can be dc. She is moving her toes, has intact sensation [BS]   1459 Reports pain with ambulation, will give toradol and re-attempt [BS]   1513 Ambulated without difficulty [BS]      ED Course User Index  [BS] Alisson Jim MD             Clinical Impression:   Final diagnoses:  [W19.XXXA] Accidental fall, initial encounter (Primary)  [S09.90XA] Closed head injury, initial encounter  [M54.6] Acute bilateral thoracic back pain          ED Disposition Condition    Discharge Stable        ED Prescriptions     Medication Sig Dispense Start Date End Date Auth. Provider    naproxen (NAPROSYN) 500 MG tablet Take 1 tablet (500 mg total) by mouth 2 (two) times daily with meals. 30 tablet 6/6/2022  Alisson Jim MD    methocarbamoL (ROBAXIN) 750 MG Tab Take 2 tablets (1,500 mg total) by mouth 3 (three) times daily.  for 5 days 30 tablet 6/6/2022 6/11/2022 Alisson Jim MD        Follow-up Information     Follow up With Specialties Details Why Contact Info    your primary care doctor  Schedule an appointment as soon as possible for a visit       [facility] - Emergency Dept Emergency Medicine  As needed, If symptoms worsen [address] [facility] [place] [address]  [phone]           Alisson Jim MD  06/06/22 1502       Alisson Jim MD  06/06/22 1513

## 2023-01-31 DIAGNOSIS — Z12.31 SCREENING MAMMOGRAM FOR HIGH-RISK PATIENT: Primary | ICD-10-CM

## 2023-02-15 DIAGNOSIS — Z12.11 ENCOUNTER FOR SCREENING COLONOSCOPY: Primary | ICD-10-CM

## 2023-04-14 DIAGNOSIS — B20 HUMAN IMMUNODEFICIENCY VIRUS (HIV) DISEASE: ICD-10-CM

## 2023-04-14 DIAGNOSIS — E11.9 TYPE 2 DIABETES MELLITUS WITHOUT COMPLICATION, UNSPECIFIED WHETHER LONG TERM INSULIN USE: Primary | ICD-10-CM

## 2023-06-15 NOTE — HISTORICAL [facility] CERNER
This is a historical note converted from Nahid. Formatting and pictures may have been removed.  Please reference Nahid for original formatting and attached multimedia. Chief Complaint  right knee pain  History of Present Illness  49 Years??Female?smoker?p[facility]?for?initial visit?for?right?knee pain.? Patient points to ?medial knee. Of note patient has pinched nerve in her back and reports numbness in her medial distal thigh. Pt states she saw a surgeon before, who provided a CSI several years ago which brought her no relief. States she was told she has torn ligaments in her knees, but is uncertain what exactly is torn, or how since she denies any specific trauma.  ?   Onset: ?insidious over years?10 years  Current pain level: 8/10 (rated as?severe) ?without medication?. Quality described as stiffness, aching. Pain worse at night, and with rain.  Modifying Factors: ?worse with/after activity; improved with rest;??stiffness after immobilization??stiffness improved with less than 30 minutes of activity  Previous treatment:?previous CSI more than 3 months ago without significant improvement in symptoms?Doing HEP, tried formal PT for a few weeks, but didnt find any relief.  Medications related to CC: None, tried 800 ibuprofen with no relief  Previous injuries:?denies  Associated Symptoms:?crepitus/grinding; ?no numbness or tingling;??constant swelling to knee;?no skin changes;?no weakness;?moderate decrease in ROM?Leg has given out on her multiple times, has had her knee lock and then pop when manually unlocking it which provides relief of her pain  Activity:?sedentary; full ADLs;?pain interferes with function/daily activity (mild). Disabled due to dyslexia  Family History:?family history of arthritis  ?  Review of Systems  Constitutional: no fever, no chills, no weight loss  CV: no swelling, no edema  GI: no urinary retention, no urinary incontinence  : no fecal incontinence  Skin: no  rash, no wound  Neuro: no numbness/tingling, no weakness, no saddle anesthesia  MSK: as above  Psych: no depression, no anxiety  Heme/Lymph: no easy bruising, no easy bleeding, no lymphadenopathy  Immuno: no allergic reaction, no recurrent infections  Physical Exam  Vitals & Measurements  HR:?89(Peripheral)? BP:?124/83?  HT:?160.00?cm? WT:?103.500?kg? BMI:?40.43?  General: well developed; well nourished; cooperative  PSYCH: alert and oriented with?appropriate mood and affect  SKIN: inspection and palpation of skin and soft tissue normal; no scars noted on upper/lower extremities  CV: vascular integrity noted; +2 symmetrical pulses, no edema  NEURO: sensation intact by light touch. Notes numbness in medial distal thigh and proximal lower leg in region of L3/L4 ; DTRs +2 bilateral and symmetrical  LYMPH: no LAD noted  ?  MSK:?right knee  Inspection:?antalgic;??partial weight bearing;??normal?alignment;??no swelling;?no?erythema;?no?bruising;?atrophy/deconditioning of quadriceps  Palpation:??diffusely tender?at medial?and lateral?joint line,?posterior knee,?distal and?proximal patella; ?Crepitus:??Positive  ROM:?  Active Extension/Flexion (0-140):?0-75  Passive?Extension/Flexion (0-140):?0-85  Strength:? Flexion??4/5, Extension??4/5  Special Tests:  Ballotable Effusion: ?Negative; Fluid Wave:??Negative  Anterior Drawer:?Negative;? Lachman:?Negative;?  Posterior Sag Sign:??Negative;? Posterior Drawer:?Negative; Dial Test:??not tested  Varus Stress:?LCL stable at 0 and 30 degrees  Valgus Stress:?MCL?stable at 0 and 30 degrees  Patellar Grind: ?Negative  Mary:?not tested?; Apleys Compression:?not tested; unable to cooperate with ROM needed  Noble Compression:?not tested; Tayler:?not tested  Neurovascular:?Intact; 2+?distal pulse, sensation intact to light touch  ?  Assessment/Plan  1.?Osteoarthritis of right knee?M17.11  ?DX: ?right?knee osteoarthritis  Discussed with patient diagnosis and treatment recommendations.?  Handout given. Given patients prolonged symptoms, and reports of mechanical locking symptoms, MRI right LE joint WO ordered for surgical planning  Imaging:?radiological studies ordered and independently reviewed; discussed with patient; pending radiologist interpretation  Treatment plan:?conservative treatment to include oral glucosamine 1500 mg/day; topical capsaicin as needed; hot or cold therapy; adequate vitamin C/D intake  Weight Management is paramount:?recommend at least 10 pounds weight loss  Procedure:?discussed CSI vs VS injections as treatment options in future if conservative measures do not improve symptoms.Currently, patient states she is not willing to repeat any injections due to pain from first injection and general fear of needles  Activity:?Activity as tolerated; HEP to included aerobic conditioning with non-painful activity and ROM/STG exercises; proper footwear; assistive device to avoid limping  Therapy:?formal PT/OT ordered?12 weeks, 3x/week. Rx for medial offloading brace provided.  Medication:?first line treatment with daily acetaminophen?1000 mg three times daily; medication precautions given  RTC:?after imaging test complete  Additional work-up:?MRI of?right knee  2.?Obesity?E66.9  ?- hand out provided for tips on weight loss  3.?Tobacco user?Z72.0  ?- Hand out provided for tips on smoking cessation  - Discussed impact of tobacco use on overall health?  Discussed with the fellow at time of visit? Patient chart reviewed. Patient seen and evaluated at time of visit.?HPI, PE, and Assessment and Plan reviewed. Treatment plan is reasonable and appropriate.? All questions were answered.?Compliance with treatment plan is appropriate.  ?Radiology images independently reviewed and agree with radiologist. ?Radiology images independently reviewed and agree with fellow.   Medications  Biktarvy oral tablet, 1 tab(s), Oral, Daily  Chantix Continuing Month 1 mg oral tablet, 1 mg= 1 tab(s), Oral,  BID  gabapentin 100 mg oral capsule, 200 mg= 2 cap(s), Oral, BID, 1 refills  hydrOXYzine hydrochloride 50 mg oral tablet, 50 mg, Oral, Daily  ipratropium-albuterol 0.5 mg-3 mg/3 mL inhalation NEB solution, 3 mL, INH, QID  Seroquel 200 mg oral tablet, 1 tab, Oral, At Bedtime  venlafaxine 150 mg oral capsule, extended release, 150 mg, Oral, Daily  Vitamin D3, 1 TAB, Oral, Daily  Diagnostic Results  Xray knee with OA. Medial joint space loss, marginal osteophytes, subchondral thickening.      23

## 2023-08-09 ENCOUNTER — HOSPITAL ENCOUNTER (OUTPATIENT)
Dept: RADIOLOGY | Facility: HOSPITAL | Age: 53
Discharge: HOME | End: 2023-08-09
Attending: INTERNAL MEDICINE
Payer: MEDICAID

## 2023-08-09 ENCOUNTER — TELEPHONE (OUTPATIENT)
Dept: RADIOLOGY | Facility: HOSPITAL | Age: 53
End: 2023-08-09

## 2023-08-09 DIAGNOSIS — Z12.31 BREAST CANCER SCREENING BY MAMMOGRAM: ICD-10-CM

## 2023-08-09 PROCEDURE — 77063 BREAST TOMOSYNTHESIS BI: CPT | Mod: 26,,, | Performed by: RADIOLOGY

## 2023-08-09 PROCEDURE — 77067 SCR MAMMO BI INCL CAD: CPT | Mod: 26,,, | Performed by: RADIOLOGY

## 2023-08-09 PROCEDURE — 77063 MAMMO DIGITAL SCREENING BILAT WITH TOMO: ICD-10-PCS | Mod: 26,,, | Performed by: RADIOLOGY

## 2023-08-09 PROCEDURE — 77067 MAMMO DIGITAL SCREENING BILAT WITH TOMO: ICD-10-PCS | Mod: 26,,, | Performed by: RADIOLOGY

## 2023-08-09 NOTE — CARE UPDATE
Diagnostic breast imaging recommended on screening mammo. Order request faxed to provider. Will contact patient to schedule appointment once received.

## 2023-08-22 ENCOUNTER — HOSPITAL ENCOUNTER (OUTPATIENT)
Dept: RADIOLOGY | Facility: HOSPITAL | Age: 53
Discharge: HOME | End: 2023-08-22
Attending: INTERNAL MEDICINE
Payer: MEDICAID

## 2023-08-22 DIAGNOSIS — R92.8 ABNORMAL SCREENING MAMMOGRAM: ICD-10-CM

## 2023-08-22 PROCEDURE — 77061 BREAST TOMOSYNTHESIS UNI: CPT | Mod: 26,RT,, | Performed by: STUDENT IN AN ORGANIZED HEALTH CARE EDUCATION/TRAINING PROGRAM

## 2023-08-22 PROCEDURE — 76642 US BREAST RIGHT LIMITED: ICD-10-PCS | Mod: 26,RT,, | Performed by: STUDENT IN AN ORGANIZED HEALTH CARE EDUCATION/TRAINING PROGRAM

## 2023-08-22 PROCEDURE — 76642 ULTRASOUND BREAST LIMITED: CPT | Mod: 26,RT,, | Performed by: STUDENT IN AN ORGANIZED HEALTH CARE EDUCATION/TRAINING PROGRAM

## 2023-08-22 PROCEDURE — 77065 DX MAMMO INCL CAD UNI: CPT | Mod: TC,RT

## 2023-08-22 PROCEDURE — 76642 ULTRASOUND BREAST LIMITED: CPT | Mod: TC,RT

## 2023-08-22 PROCEDURE — 77065 MAMMO DIGITAL DIAGNOSTIC RIGHT WITH TOMO: ICD-10-PCS | Mod: 26,RT,, | Performed by: STUDENT IN AN ORGANIZED HEALTH CARE EDUCATION/TRAINING PROGRAM

## 2023-08-22 PROCEDURE — 77061 MAMMO DIGITAL DIAGNOSTIC RIGHT WITH TOMO: ICD-10-PCS | Mod: 26,RT,, | Performed by: STUDENT IN AN ORGANIZED HEALTH CARE EDUCATION/TRAINING PROGRAM

## 2023-08-22 PROCEDURE — 77065 DX MAMMO INCL CAD UNI: CPT | Mod: 26,RT,, | Performed by: STUDENT IN AN ORGANIZED HEALTH CARE EDUCATION/TRAINING PROGRAM
